# Patient Record
Sex: FEMALE | URBAN - METROPOLITAN AREA
[De-identification: names, ages, dates, MRNs, and addresses within clinical notes are randomized per-mention and may not be internally consistent; named-entity substitution may affect disease eponyms.]

---

## 2024-10-17 ENCOUNTER — APPOINTMENT (RX ONLY)
Dept: URBAN - METROPOLITAN AREA CLINIC 11 | Facility: CLINIC | Age: 64
Setting detail: DERMATOLOGY
End: 2024-10-17

## 2024-10-17 DIAGNOSIS — Z41.9 ENCOUNTER FOR PROCEDURE FOR PURPOSES OTHER THAN REMEDYING HEALTH STATE, UNSPECIFIED: ICD-10-CM

## 2024-10-17 PROCEDURE — ? DYSPORT

## 2024-10-17 PROCEDURE — ? COSMETIC CONSULTATION: AGING FACE

## 2024-10-17 ASSESSMENT — LOCATION SIMPLE DESCRIPTION DERM
LOCATION SIMPLE: RIGHT TEMPLE
LOCATION SIMPLE: LEFT TEMPLE
LOCATION SIMPLE: LEFT FOREHEAD
LOCATION SIMPLE: GLABELLA
LOCATION SIMPLE: RIGHT FOREHEAD

## 2024-10-17 ASSESSMENT — LOCATION ZONE DERM: LOCATION ZONE: FACE

## 2024-10-17 ASSESSMENT — LOCATION DETAILED DESCRIPTION DERM
LOCATION DETAILED: RIGHT INFERIOR TEMPLE
LOCATION DETAILED: RIGHT FOREHEAD
LOCATION DETAILED: LEFT INFERIOR TEMPLE
LOCATION DETAILED: LEFT FOREHEAD
LOCATION DETAILED: GLABELLA

## 2024-10-17 NOTE — PROCEDURE: DYSPORT
Additional Area 6 Units: 0
Show Lcl Units: No
Show Topical Anesthesia: Yes
Lot #: [id number]
Dilution (U/0.1 Cc): 12
Forehead Units: 10
Glabellar Complex Units: 22
Inferior Lateral Orbicularis Oculi Units: 20
Expiration Date (Month Year): 1/26
Detail Level: Detailed
Price (Use Numbers Only, No Special Characters Or $): 045.97
Consent: Written consent obtained. Risks include but not limited to lid/brow ptosis, bruising, swelling, diplopia, temporary effect, incomplete chemical denervation.
Post-Care Instructions: Patient instructed to not lie down for 4 hours and limit physical activity for 24 hours.

## 2024-11-14 ENCOUNTER — APPOINTMENT (RX ONLY)
Dept: URBAN - METROPOLITAN AREA CLINIC 11 | Facility: CLINIC | Age: 64
Setting detail: DERMATOLOGY
End: 2024-11-14

## 2024-11-14 DIAGNOSIS — Z41.9 ENCOUNTER FOR PROCEDURE FOR PURPOSES OTHER THAN REMEDYING HEALTH STATE, UNSPECIFIED: ICD-10-CM

## 2024-11-14 PROCEDURE — ? BOTOX

## 2024-11-14 ASSESSMENT — LOCATION ZONE DERM: LOCATION ZONE: LIP

## 2024-11-14 ASSESSMENT — LOCATION SIMPLE DESCRIPTION DERM: LOCATION SIMPLE: UPPER LIP

## 2024-11-14 ASSESSMENT — LOCATION DETAILED DESCRIPTION DERM: LOCATION DETAILED: PHILTRUM

## 2024-11-14 NOTE — PROCEDURE: MIPS QUALITY
Quality 431: Preventive Care And Screening: Unhealthy Alcohol Use - Screening: Patient not identified as an unhealthy alcohol user when screened for unhealthy alcohol use using a systematic screening method
Detail Level: Detailed
Quality 130: Documentation Of Current Medications In The Medical Record: Current Medications Documented
Quality 226: Preventive Care And Screening: Tobacco Use: Screening And Cessation Intervention: Patient screened for tobacco use and is an ex/non-smoker
normal

## 2024-11-14 NOTE — PROCEDURE: BOTOX
Periorbital Skin Units: 0
Show Additional Area 6: Yes
Show Ucl Units: No
Expiration Date (Month Year): 11/26
Consent: Written consent obtained. Risks include but not limited to lid/brow ptosis, bruising, swelling, diplopia, temporary effect, incomplete chemical denervation.
Price (Use Numbers Only, No Special Characters Or $): 52.00
Post-Care Instructions: Patient instructed to not lie down for 4 hours and limit physical activity for 24 hours.
Incrementing Botox Units: By 0.5 Units
Additional Area 1 Location: lip flip
Lot #: [id number]
Additional Area 1 Units: 4
Detail Level: Detailed

## 2024-12-18 ENCOUNTER — APPOINTMENT (OUTPATIENT)
Dept: URBAN - METROPOLITAN AREA CLINIC 11 | Facility: CLINIC | Age: 64
Setting detail: DERMATOLOGY
End: 2024-12-18

## 2024-12-18 DIAGNOSIS — Z41.9 ENCOUNTER FOR PROCEDURE FOR PURPOSES OTHER THAN REMEDYING HEALTH STATE, UNSPECIFIED: ICD-10-CM

## 2024-12-18 PROCEDURE — ? DYSPORT

## 2024-12-18 NOTE — PROCEDURE: DYSPORT
Anterior Platysmal Bands Units: 0
Show Orbicularis Oculi Units: Yes
Additional Area 1 Units: 6
Show Right And Left Periorbital Units: No
Price (Use Numbers Only, No Special Characters Or $): 142
Periorbital Skin Units: 20
Forehead Units: 10
Lot #: [id number]
Reconstitution Date (Optional): 12/18/24
Consent: Written consent obtained. Risks include but not limited to lid/brow ptosis, bruising, swelling, diplopia, temporary effect, incomplete chemical denervation.
Additional Comments: PT was given discount for buy 40, get 10 units free due to being r/s from promotional time. Converted to equivalent in dysport. Price listed after discount here.
Post-Care Instructions: Patient instructed to not lie down for 4 hours and limit physical activity for 24 hours.
Detail Level: Detailed
Expiration Date (Month Year): 5/31/2026
Additional Area 1 Location: orbicularis oris
Dilution (U/0.1 Cc): 12

## 2025-02-06 ENCOUNTER — RX ONLY (RX ONLY)
Age: 65
End: 2025-02-06

## 2025-02-06 ENCOUNTER — APPOINTMENT (OUTPATIENT)
Dept: URBAN - METROPOLITAN AREA CLINIC 11 | Facility: CLINIC | Age: 65
Setting detail: DERMATOLOGY
End: 2025-02-06

## 2025-02-06 DIAGNOSIS — Z41.9 ENCOUNTER FOR PROCEDURE FOR PURPOSES OTHER THAN REMEDYING HEALTH STATE, UNSPECIFIED: ICD-10-CM

## 2025-02-06 PROCEDURE — ? DYSPORT

## 2025-02-06 RX ORDER — BIMATOPROST 0.3 MG/ML
SOLUTION/ DROPS OPHTHALMIC
Qty: 3 | Refills: 2 | Status: ERX | COMMUNITY
Start: 2025-02-06

## 2025-02-06 RX ORDER — TRETIONIN 0.25 MG/G
CREAM TOPICAL
Qty: 45 | Refills: 3 | Status: ERX | COMMUNITY
Start: 2025-02-06

## 2025-02-06 NOTE — PROCEDURE: DYSPORT
Expiration Date (Month Year): 5/26
Show Right And Left Brow Units: No
Nasal Root Units: 0
Show Additional Area 3: Yes
Price (Use Numbers Only, No Special Characters Or $): 540.00
Additional Comments: ****$459 after 15% discount cosmetic promotion
Glabellar Complex Units: 14
Forehead Units: 6
Consent: Written consent obtained. Risks include but not limited to lid/brow ptosis, bruising, swelling, diplopia, temporary effect, incomplete chemical denervation.
Post-Care Instructions: Patient instructed to not lie down for 4 hours and limit physical activity for 24 hours.
Detail Level: Detailed
Lot #: [id number]
Periorbital Skin Units: 20
Dilution (U/0.1 Cc): 12

## 2025-06-25 ENCOUNTER — APPOINTMENT (OUTPATIENT)
Dept: URBAN - METROPOLITAN AREA CLINIC 11 | Facility: CLINIC | Age: 65
Setting detail: DERMATOLOGY
End: 2025-06-25

## 2025-06-25 DIAGNOSIS — Z41.9 ENCOUNTER FOR PROCEDURE FOR PURPOSES OTHER THAN REMEDYING HEALTH STATE, UNSPECIFIED: ICD-10-CM

## 2025-06-25 PROCEDURE — ? BOTOX

## 2025-06-25 ASSESSMENT — LOCATION ZONE DERM: LOCATION ZONE: FACE

## 2025-06-25 ASSESSMENT — LOCATION DETAILED DESCRIPTION DERM: LOCATION DETAILED: LEFT MEDIAL FOREHEAD

## 2025-06-25 ASSESSMENT — LOCATION SIMPLE DESCRIPTION DERM: LOCATION SIMPLE: LEFT FOREHEAD

## 2025-06-25 NOTE — PROCEDURE: BOTOX
Nasal Root Units: 0
Show Lcl Units: No
Dilution (U/0.1 Cc): 4
Show Additional Area 2: Yes
Comments: 10% off existing cosmetic patient for promotion applied to price listed here
Periorbital Skin Units: 20
Incrementing Botox Units: By 0.5 Units
Additional Area 1 Location: lip flip
Price (Use Numbers Only, No Special Characters Or $): 343
Forehead Units: 10
Consent: Written consent obtained. Risks include but not limited to lid/brow ptosis, bruising, swelling, diplopia, temporary effect, incomplete chemical denervation.
Detail Level: Detailed
Post-Care Instructions: Patient instructed to not lie down for 4 hours and limit physical activity for 24 hours.
Lot #: [id number]

## 2025-07-16 ENCOUNTER — APPOINTMENT (OUTPATIENT)
Dept: URBAN - METROPOLITAN AREA CLINIC 11 | Facility: CLINIC | Age: 65
Setting detail: DERMATOLOGY
End: 2025-07-16

## 2025-07-16 DIAGNOSIS — Z41.9 ENCOUNTER FOR PROCEDURE FOR PURPOSES OTHER THAN REMEDYING HEALTH STATE, UNSPECIFIED: ICD-10-CM

## 2025-07-16 PROCEDURE — ? COSMETIC FOLLOW-UP

## 2025-07-16 ASSESSMENT — LOCATION ZONE DERM: LOCATION ZONE: FACE

## 2025-07-16 ASSESSMENT — LOCATION DETAILED DESCRIPTION DERM: LOCATION DETAILED: GLABELLA

## 2025-07-16 ASSESSMENT — LOCATION SIMPLE DESCRIPTION DERM: LOCATION SIMPLE: GLABELLA

## 2025-07-16 NOTE — PROCEDURE: COSMETIC FOLLOW-UP
Comments (Free Text): Pt satisfied with results, wants to treat loss of volume in lower face. Recommended [employer], Contour, and defyne for mid face and marionette. Quoted anywhere from $0385-6532.
Detail Level: Zone